# Patient Record
Sex: MALE | Race: WHITE | NOT HISPANIC OR LATINO | Employment: OTHER | URBAN - METROPOLITAN AREA
[De-identification: names, ages, dates, MRNs, and addresses within clinical notes are randomized per-mention and may not be internally consistent; named-entity substitution may affect disease eponyms.]

---

## 2021-10-19 ENCOUNTER — HISTORICAL (OUTPATIENT)
Dept: ADMINISTRATIVE | Facility: HOSPITAL | Age: 81
End: 2021-10-19

## 2022-05-09 DIAGNOSIS — R19.5 ABNORMAL STOOLS: Primary | ICD-10-CM

## 2022-09-21 ENCOUNTER — OFFICE VISIT (OUTPATIENT)
Dept: GASTROENTEROLOGY | Facility: CLINIC | Age: 82
End: 2022-09-21
Payer: MEDICARE

## 2022-09-21 VITALS
DIASTOLIC BLOOD PRESSURE: 89 MMHG | OXYGEN SATURATION: 95 % | HEIGHT: 71 IN | HEART RATE: 80 BPM | BODY MASS INDEX: 25.79 KG/M2 | SYSTOLIC BLOOD PRESSURE: 162 MMHG | WEIGHT: 184.19 LBS | TEMPERATURE: 98 F

## 2022-09-21 DIAGNOSIS — D50.9 IRON DEFICIENCY ANEMIA, UNSPECIFIED IRON DEFICIENCY ANEMIA TYPE: Primary | ICD-10-CM

## 2022-09-21 DIAGNOSIS — R19.5 ABNORMAL STOOLS: ICD-10-CM

## 2022-09-21 DIAGNOSIS — K21.9 GASTROESOPHAGEAL REFLUX DISEASE, UNSPECIFIED WHETHER ESOPHAGITIS PRESENT: ICD-10-CM

## 2022-09-21 DIAGNOSIS — R19.5 POSITIVE FIT (FECAL IMMUNOCHEMICAL TEST): ICD-10-CM

## 2022-09-21 DIAGNOSIS — Z86.010 PERSONAL HISTORY OF COLONIC POLYPS: ICD-10-CM

## 2022-09-21 PROCEDURE — 1159F PR MEDICATION LIST DOCUMENTED IN MEDICAL RECORD: ICD-10-PCS | Mod: CPTII,S$GLB,, | Performed by: INTERNAL MEDICINE

## 2022-09-21 PROCEDURE — 1160F RVW MEDS BY RX/DR IN RCRD: CPT | Mod: CPTII,S$GLB,, | Performed by: INTERNAL MEDICINE

## 2022-09-21 PROCEDURE — 3079F PR MOST RECENT DIASTOLIC BLOOD PRESSURE 80-89 MM HG: ICD-10-PCS | Mod: CPTII,S$GLB,, | Performed by: INTERNAL MEDICINE

## 2022-09-21 PROCEDURE — 3079F DIAST BP 80-89 MM HG: CPT | Mod: CPTII,S$GLB,, | Performed by: INTERNAL MEDICINE

## 2022-09-21 PROCEDURE — 99204 PR OFFICE/OUTPT VISIT, NEW, LEVL IV, 45-59 MIN: ICD-10-PCS | Mod: S$GLB,,, | Performed by: INTERNAL MEDICINE

## 2022-09-21 PROCEDURE — 1160F PR REVIEW ALL MEDS BY PRESCRIBER/CLIN PHARMACIST DOCUMENTED: ICD-10-PCS | Mod: CPTII,S$GLB,, | Performed by: INTERNAL MEDICINE

## 2022-09-21 PROCEDURE — 3077F PR MOST RECENT SYSTOLIC BLOOD PRESSURE >= 140 MM HG: ICD-10-PCS | Mod: CPTII,S$GLB,, | Performed by: INTERNAL MEDICINE

## 2022-09-21 PROCEDURE — 99204 OFFICE O/P NEW MOD 45 MIN: CPT | Mod: S$GLB,,, | Performed by: INTERNAL MEDICINE

## 2022-09-21 PROCEDURE — 3077F SYST BP >= 140 MM HG: CPT | Mod: CPTII,S$GLB,, | Performed by: INTERNAL MEDICINE

## 2022-09-21 PROCEDURE — 1159F MED LIST DOCD IN RCRD: CPT | Mod: CPTII,S$GLB,, | Performed by: INTERNAL MEDICINE

## 2022-09-21 RX ORDER — METHOCARBAMOL 500 MG/1
TABLET, FILM COATED ORAL
COMMUNITY
Start: 2022-06-16

## 2022-09-21 RX ORDER — SODIUM, POTASSIUM,MAG SULFATES 17.5-3.13G
1 SOLUTION, RECONSTITUTED, ORAL ORAL ONCE
Qty: 1 KIT | Refills: 0 | Status: SHIPPED | OUTPATIENT
Start: 2022-09-21 | End: 2022-09-21

## 2022-09-21 RX ORDER — ROSUVASTATIN CALCIUM 40 MG/1
TABLET, COATED ORAL
COMMUNITY
Start: 2022-07-24

## 2022-09-21 RX ORDER — ALBUTEROL SULFATE 90 UG/1
2 AEROSOL, METERED RESPIRATORY (INHALATION) EVERY 4 HOURS PRN
COMMUNITY
Start: 2022-07-15

## 2022-09-21 RX ORDER — VALSARTAN 160 MG/1
160 TABLET ORAL DAILY
COMMUNITY

## 2022-09-21 RX ORDER — IRBESARTAN 150 MG/1
TABLET ORAL
COMMUNITY
Start: 2022-06-27

## 2022-09-21 RX ORDER — DILTIAZEM HYDROCHLORIDE 240 MG/1
180 CAPSULE, EXTENDED RELEASE ORAL DAILY
COMMUNITY

## 2022-09-21 RX ORDER — EZETIMIBE 10 MG/1
TABLET ORAL
COMMUNITY
Start: 2022-08-18

## 2022-09-21 NOTE — PROGRESS NOTES
Clinic Note    Reason for visit:  The primary encounter diagnosis was Iron deficiency anemia, unspecified iron deficiency anemia type. Diagnoses of Gastroesophageal reflux disease, unspecified whether esophagitis present, Positive FIT (fecal immunochemical test), Personal history of colonic polyps, and Abnormal stools were also pertinent to this visit.    PCP: Hans Lanza   [address] [facility] / [place] [id number]    HPI:  This is a 81 y.o. male who is being referred-he has anemia and has heme positive stools per referral.  He denies melena or BRBPR.  Denies NSAIDs, abdominal pain, CP, or SOB.  He does have renal insufficiency. Reports some intermittent heartburn.     COLON 5/2019-tortuous colon, diverticuli, no polyps.      Review of Systems   Constitutional:  Negative for chills, diaphoresis, fatigue, fever and unexpected weight change.   HENT:  Negative for hearing loss, mouth sores, nosebleeds, postnasal drip, sore throat, trouble swallowing and voice change.    Eyes:  Negative for pain, discharge and eye dryness.   Respiratory:  Positive for cough. Negative for apnea, choking, chest tightness, shortness of breath and wheezing.    Cardiovascular:  Negative for chest pain, palpitations, leg swelling and claudication.   Gastrointestinal:  Negative for abdominal distention, abdominal pain, anal bleeding, blood in stool, change in bowel habit, constipation, diarrhea, nausea, rectal pain, vomiting, reflux, fecal incontinence and change in bowel habit.   Genitourinary:  Negative for bladder incontinence, difficulty urinating, dysuria, flank pain, frequency and hematuria.   Musculoskeletal:  Positive for back pain. Negative for arthralgias, joint swelling and joint deformity.   Integumentary:  Negative for color change, rash and wound.   Allergic/Immunologic: Positive for environmental allergies. Negative for food allergies.   Neurological:  Negative for seizures, facial asymmetry, speech difficulty, weakness,  "headaches and memory loss.   Hematological:  Negative for adenopathy. Does not bruise/bleed easily.   Psychiatric/Behavioral:  Negative for agitation, behavioral problems, confusion, hallucinations and sleep disturbance.       Past Medical History:   Diagnosis Date    Colon polyp     Essential (primary) hypertension     Hyperlipidemia     Renal insufficiency     Sleep apnea, unspecified      Past Surgical History:   Procedure Laterality Date    APPENDECTOMY      CARPAL TUNNEL RELEASE      COLONOSCOPY      SHOULDER SURGERY Left     UPPER GASTROINTESTINAL ENDOSCOPY       Family History   Problem Relation Age of Onset    Kidney failure Mother     Heart disease Father     Ulcerative colitis Neg Hx     Stomach cancer Neg Hx     Liver cancer Neg Hx     Crohn's disease Neg Hx     Throat cancer Neg Hx     Esophageal cancer Neg Hx     Pancreatic cancer Neg Hx      Social History     Tobacco Use    Smoking status: Never     Passive exposure: Never    Smokeless tobacco: Never   Substance Use Topics    Alcohol use: Yes     Comment: rarely     Review of patient's allergies indicates:  No Known Allergies     Medication List with Changes/Refills   New Medications    SODIUM,POTASSIUM,MAG SULFATES (SUPREP BOWEL PREP KIT) 17.5-3.13-1.6 GRAM SOLR    Take 177 mLs by mouth once. Take according to kit instructions but DO NOT eat breakfast the morning of procedure. for 1 dose   Current Medications    ALBUTEROL (PROVENTIL/VENTOLIN HFA) 90 MCG/ACTUATION INHALER    Inhale 2 puffs into the lungs every 4 (four) hours as needed.    DILTIAZEM (DILACOR XR) 240 MG CDCR    Take 180 mg by mouth once daily.    EZETIMIBE (ZETIA) 10 MG TABLET        IRBESARTAN (AVAPRO) 150 MG TABLET        METHOCARBAMOL (ROBAXIN) 500 MG TAB        ROSUVASTATIN (CRESTOR) 40 MG TAB        VALSARTAN (DIOVAN) 160 MG TABLET    Take 160 mg by mouth once daily.         Vital Signs:  BP (!) 162/89   Pulse 80   Temp 98.1 °F (36.7 °C)   Ht 5' 11" (1.803 m)   Wt 83.6 kg (184 " lb 3.2 oz)   SpO2 95%   BMI 25.69 kg/m²         Physical Exam  Constitutional:       General: He is not in acute distress.     Appearance: Normal appearance. He is well-developed. He is not ill-appearing or toxic-appearing.   HENT:      Head: Normocephalic and atraumatic.      Nose: Nose normal.      Mouth/Throat:      Mouth: Mucous membranes are moist.      Pharynx: Oropharynx is clear. No oropharyngeal exudate or posterior oropharyngeal erythema.   Eyes:      General: Lids are normal. No scleral icterus.        Right eye: No discharge.         Left eye: No discharge.      Extraocular Movements: Extraocular movements intact.      Conjunctiva/sclera: Conjunctivae normal.   Cardiovascular:      Rate and Rhythm: Normal rate and regular rhythm.      Pulses:           Radial pulses are 2+ on the right side and 2+ on the left side.   Pulmonary:      Effort: Pulmonary effort is normal. No respiratory distress.      Breath sounds: No stridor. No wheezing or rhonchi.   Abdominal:      General: Bowel sounds are normal. There is no distension.      Palpations: Abdomen is soft. There is no fluid wave, hepatomegaly, splenomegaly or mass.      Tenderness: There is no abdominal tenderness. There is no guarding or rebound.   Musculoskeletal:      Cervical back: Full passive range of motion without pain.      Right lower leg: No edema.      Left lower leg: No edema.   Lymphadenopathy:      Cervical: No cervical adenopathy.   Skin:     General: Skin is warm and dry.      Capillary Refill: Capillary refill takes less than 2 seconds.      Coloration: Skin is not cyanotic, jaundiced or pale.      Findings: No rash.   Neurological:      General: No focal deficit present.      Mental Status: He is alert and oriented to person, place, and time.   Psychiatric:         Mood and Affect: Mood normal.         Behavior: Behavior is cooperative.          All of the data above and below has been reviewed by myself and any further  interpretations will be reflected in the assessment and plan.   The data includes review of external notes, and independent interpretation of lab results, procedures, x-rays, and imaging reports.      Assessment:  Iron deficiency anemia, unspecified iron deficiency anemia type  -     Ambulatory Referral to External Surgery    Gastroesophageal reflux disease, unspecified whether esophagitis present  -     Ambulatory Referral to External Surgery    Positive FIT (fecal immunochemical test)  -     Ambulatory Referral to External Surgery    Personal history of colonic polyps    Abnormal stools  -     Ambulatory referral/consult to Gastroenterology    Other orders  -     sodium,potassium,mag sulfates (SUPREP BOWEL PREP KIT) 17.5-3.13-1.6 gram SolR; Take 177 mLs by mouth once. Take according to kit instructions but DO NOT eat breakfast the morning of procedure. for 1 dose  Dispense: 1 kit; Refill: 0    If EGD/Colon negative as source of anemia, will consider capsule endoscopy.     Recommendations:  Schedule EGD/Colonoscopy at [facility] with suprep    Risks, benefits, and alternatives of medical management, any associated procedures, and/or treatment discussed with the patient. Patient given opportunity to ask questions and voices understanding. Patient has elected to proceed with the recommended care modalities as discussed.    Follow up if symptoms worsen or fail to improve.    Order summary:  Orders Placed This Encounter   Procedures    Ambulatory Referral to External Surgery          Instructed patient to notify my office if they have not been contacted within two weeks after any procedures, submitting any samples (biopsies, blood, stool, urine, etc.) or after any imaging (X-ray, CT, MRI, etc.).     Bill Harry MD    This document may have been created using a voice recognition transcribing system. Incorrect words or phrases may have been missed during proofreading. Please interpret accordingly or contact me for  clarification.

## 2022-10-18 ENCOUNTER — OUTSIDE PLACE OF SERVICE (OUTPATIENT)
Dept: GASTROENTEROLOGY | Facility: CLINIC | Age: 82
End: 2022-10-18

## 2022-10-18 LAB
CRC RECOMMENDATION EXT: NORMAL
EGD FOLLOW UP EXTERNAL: NORMAL
EGD FOLLOW UP EXTERNAL: NORMAL

## 2022-10-18 PROCEDURE — 43239 EGD BIOPSY SINGLE/MULTIPLE: CPT | Mod: 51,,, | Performed by: INTERNAL MEDICINE

## 2022-10-18 PROCEDURE — 45385 PR COLONOSCOPY,REMV LESN,SNARE: ICD-10-PCS | Mod: ,,, | Performed by: INTERNAL MEDICINE

## 2022-10-18 PROCEDURE — 45385 COLONOSCOPY W/LESION REMOVAL: CPT | Mod: ,,, | Performed by: INTERNAL MEDICINE

## 2022-10-18 PROCEDURE — 43239 PR EGD, FLEX, W/BIOPSY, SGL/MULTI: ICD-10-PCS | Mod: 51,,, | Performed by: INTERNAL MEDICINE

## 2022-10-24 ENCOUNTER — TELEPHONE (OUTPATIENT)
Dept: GASTROENTEROLOGY | Facility: CLINIC | Age: 82
End: 2022-10-24
Payer: MEDICARE

## 2022-10-24 DIAGNOSIS — K22.70 BARRETT'S ESOPHAGUS WITHOUT DYSPLASIA: Primary | ICD-10-CM

## 2022-10-24 DIAGNOSIS — D50.9 IRON DEFICIENCY ANEMIA, UNSPECIFIED IRON DEFICIENCY ANEMIA TYPE: ICD-10-CM

## 2022-10-24 DIAGNOSIS — K22.10 ULCERATIVE ESOPHAGITIS: ICD-10-CM

## 2022-10-27 VITALS — WEIGHT: 184 LBS | HEIGHT: 71 IN | BODY MASS INDEX: 25.76 KG/M2

## 2022-10-27 NOTE — TELEPHONE ENCOUNTER
"[facility]  [address] [place] [address]  Phone: [phone]  Fax: [phone]    History & Physical         Provider: Dr. Bill Harry    Patient Name: Franki VERA (age):1940  81 y.o.           Gender: male   Phone: [phone]     Referring Physician: Hans Lanza     Vital Signs:   Height - 5' 11"  Weight - 184 lbs  BMI - 25.66      Plan: EGD    Encounter Diagnosis   Name Primary?    Iron deficiency anemia, unspecified iron deficiency anemia type Yes           History:      Past Medical History:   Diagnosis Date    Colon polyp     Essential (primary) hypertension     Hyperlipidemia     Renal insufficiency     Sleep apnea, unspecified       Past Surgical History:   Procedure Laterality Date    APPENDECTOMY      CARPAL TUNNEL RELEASE      COLONOSCOPY      SHOULDER SURGERY Left     UPPER GASTROINTESTINAL ENDOSCOPY        Medication List with Changes/Refills   Current Medications    ALBUTEROL (PROVENTIL/VENTOLIN HFA) 90 MCG/ACTUATION INHALER    Inhale 2 puffs into the lungs every 4 (four) hours as needed.    DILTIAZEM (DILACOR XR) 240 MG CDCR    Take 180 mg by mouth once daily.    EZETIMIBE (ZETIA) 10 MG TABLET        IRBESARTAN (AVAPRO) 150 MG TABLET        METHOCARBAMOL (ROBAXIN) 500 MG TAB        ROSUVASTATIN (CRESTOR) 40 MG TAB        VALSARTAN (DIOVAN) 160 MG TABLET    Take 160 mg by mouth once daily.      Review of patient's allergies indicates:  No Known Allergies   Family History   Problem Relation Age of Onset    Kidney failure Mother     Heart disease Father     Ulcerative colitis Neg Hx     Stomach cancer Neg Hx     Liver cancer Neg Hx     Crohn's disease Neg Hx     Throat cancer Neg Hx     Esophageal cancer Neg Hx     Pancreatic cancer Neg Hx       Social History     Tobacco Use    Smoking status: Never     Passive exposure: Never    Smokeless tobacco: Never   Substance " Use Topics    Alcohol use: Yes     Comment: rarely        Physical Examination:     General Appearance:___________________________  HEENT: _____________________________________  Abdomen:____________________________________  Heart:________________________________________  Lungs:_______________________________________  Extremities:___________________________________  Skin:_________________________________________  Endocrine:____________________________________  Genitourinary:_________________________________  Neurological:__________________________________      Patient has been evaluated immediately prior to sedation and is medically cleared for endoscopy with IVCS as an ASA class: ______      Physician Signature: _________________________       Date: ________  Time: ________

## 2022-10-27 NOTE — TELEPHONE ENCOUNTER
Contacted patient and scheduled Repeat EGD @ [facility] on December 14, 2022 per Dr Harry & Faith. Discussed EGD Prep with patient.  David

## 2022-10-27 NOTE — TELEPHONE ENCOUNTER
"[facility]  [address] [place] [address]  Phone: [phone]  Fax: [phone]    History & Physical         Provider: Dr. Bill Harry    Patient Name: Franki VERA (age):1940  81 y.o.           Gender: male   Phone: [phone]     Referring Physician: Hans Lanza     Vital Signs:   Height - 5' 11"  Weight - 184 lbs  BMI - 25.66      Plan: EGD    Encounter Diagnoses   Name Primary?    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 Yes    Ulcerative esophagitis     Iron deficiency anemia, unspecified iron deficiency anemia type            History:      Past Medical History:   Diagnosis Date    BMI 25.0-25.9,adult     Colon polyp     Essential (primary) hypertension     Hyperlipidemia     Renal insufficiency     Sleep apnea, unspecified       Past Surgical History:   Procedure Laterality Date    APPENDECTOMY      CARPAL TUNNEL RELEASE      COLONOSCOPY  10/18/2022    ESOPHAGOGASTRODUODENOSCOPY  10/18/2022    EGD    ESOPHAGOGASTRODUODENOSCOPY      EGD Repeated on 2022    SHOULDER SURGERY Left     UPPER GASTROINTESTINAL ENDOSCOPY        Medication List with Changes/Refills   Current Medications    ALBUTEROL (PROVENTIL/VENTOLIN HFA) 90 MCG/ACTUATION INHALER    Inhale 2 puffs into the lungs every 4 (four) hours as needed.    DILTIAZEM (DILACOR XR) 240 MG CDCR    Take 180 mg by mouth once daily.    EZETIMIBE (ZETIA) 10 MG TABLET        IRBESARTAN (AVAPRO) 150 MG TABLET        METHOCARBAMOL (ROBAXIN) 500 MG TAB        ROSUVASTATIN (CRESTOR) 40 MG TAB        VALSARTAN (DIOVAN) 160 MG TABLET    Take 160 mg by mouth once daily.      Review of patient's allergies indicates:  No Known Allergies   Family History   Problem Relation Age of Onset    Kidney failure Mother     Heart disease Father     Ulcerative colitis Neg Hx     Stomach cancer Neg Hx     Liver cancer Neg Hx     Crohn's disease Neg Hx "     Throat cancer Neg Hx     Esophageal cancer Neg Hx     Pancreatic cancer Neg Hx       Social History     Tobacco Use    Smoking status: Never     Passive exposure: Never    Smokeless tobacco: Never   Substance Use Topics    Alcohol use: Yes     Comment: rarely        Physical Examination:     General Appearance:___________________________  HEENT: _____________________________________  Abdomen:____________________________________  Heart:________________________________________  Lungs:_______________________________________  Extremities:___________________________________  Skin:_________________________________________  Endocrine:____________________________________  Genitourinary:_________________________________  Neurological:__________________________________      Patient has been evaluated immediately prior to sedation and is medically cleared for endoscopy with IVCS as an ASA class: ______      Physician Signature: _________________________       Date: ________  Time: ________

## 2022-12-02 ENCOUNTER — DOCUMENTATION ONLY (OUTPATIENT)
Dept: GASTROENTEROLOGY | Facility: CLINIC | Age: 82
End: 2022-12-02
Payer: MEDICARE

## 2022-12-14 ENCOUNTER — OUTSIDE PLACE OF SERVICE (OUTPATIENT)
Dept: GASTROENTEROLOGY | Facility: CLINIC | Age: 82
End: 2022-12-14

## 2022-12-14 PROCEDURE — 43239 PR EGD, FLEX, W/BIOPSY, SGL/MULTI: ICD-10-PCS | Mod: ,,, | Performed by: INTERNAL MEDICINE

## 2022-12-14 PROCEDURE — 43239 EGD BIOPSY SINGLE/MULTIPLE: CPT | Mod: ,,, | Performed by: INTERNAL MEDICINE

## 2023-01-03 ENCOUNTER — TELEPHONE (OUTPATIENT)
Dept: GASTROENTEROLOGY | Facility: CLINIC | Age: 83
End: 2023-01-03
Payer: MEDICARE

## 2023-01-03 NOTE — TELEPHONE ENCOUNTER
Per Dr. Harry: Bx's of esophagus show reflux but no Tsai's-continue pantoprazole-defer repeat EGD unless patient has issues.     Called patient with results and instructions, verbalized understanding.

## 2023-01-23 RX ORDER — PANTOPRAZOLE SODIUM 40 MG/1
40 TABLET, DELAYED RELEASE ORAL DAILY
Qty: 30 TABLET | Refills: 11 | Status: SHIPPED | OUTPATIENT
Start: 2023-01-23 | End: 2023-05-08 | Stop reason: SDUPTHER

## 2023-01-23 NOTE — TELEPHONE ENCOUNTER
----- Message from Jadyn Valverde sent at 1/23/2023  9:31 AM CST -----  Type:  Needs Medical Advice    Who Called: Franki Tsai    Symptoms (please be specific): -   How long has patient had these symptoms:  -  Pharmacy name and phone #:    [facility] [id number] - [place] - [address] AT [facility] [address]  [address] [place] [address]  Phone: [phone] Fax: [phone]      Would the patient rather a call back or a response via [facility]? -  Best Call Back Number: [phone]    Additional Information: pt wants to know if he iss to continue taking the medication pantoprazole 40 MG? ( If so he needs a refill ) 90 day supply needs to be sent to [facility], 30 day supply [facility]

## 2023-04-24 ENCOUNTER — TELEPHONE (OUTPATIENT)
Dept: GASTROENTEROLOGY | Facility: CLINIC | Age: 83
End: 2023-04-24
Payer: MEDICARE

## 2023-04-24 NOTE — TELEPHONE ENCOUNTER
----- Message from Kathy Leonard sent at 4/24/2023  3:57 PM CDT -----  Regarding: Medication  Contact: patient  Per phone call with patient, he stated that if the medication is a 90 day supply it should go to [facility] and if it is something urgent it [facility]  Please return call at [phone].    There was a prescription that was sent to [facility] and it was canceled.  Please submit the medication to [facility].    Thanks,  SJ

## 2023-05-08 NOTE — TELEPHONE ENCOUNTER
----- Message from Beth Marshall sent at 5/8/2023  9:52 AM CDT -----  Regarding: med refill/pharm change  Contact: pt  Pt is calling to get a rx for pantoprazole (PROTONIX) 40 MG tablet sent to [facility]. Pt states that he needs a 90 day supply with refills. Pt states that he called last week and that the rx was not sent.  Please call back at [phone]//thank you acc

## 2023-05-08 NOTE — TELEPHONE ENCOUNTER
----- Message from Beth Marshall sent at 5/8/2023  9:52 AM CDT -----  Regarding: med refill/pharm change  Contact: pt  Pt is calling to get a rx for pantoprazole (PROTONIX) 40 MG tablet sent to [facility]. Pt states that he needs a 90 day supply with refills. Pt states that he called last week and that the rx was not sent.  Please call back at [phone]//thank you acc

## 2023-05-09 RX ORDER — PANTOPRAZOLE SODIUM 40 MG/1
40 TABLET, DELAYED RELEASE ORAL DAILY
Qty: 90 TABLET | Refills: 3 | Status: SHIPPED | OUTPATIENT
Start: 2023-05-09 | End: 2024-05-08

## 2023-05-09 RX ORDER — PANTOPRAZOLE SODIUM 40 MG/1
40 TABLET, DELAYED RELEASE ORAL DAILY
Qty: 30 TABLET | Refills: 11 | Status: SHIPPED | OUTPATIENT
Start: 2023-05-09 | End: 2023-05-09 | Stop reason: SDUPTHER

## 2024-05-21 ENCOUNTER — TELEPHONE (OUTPATIENT)
Dept: GASTROENTEROLOGY | Facility: CLINIC | Age: 84
End: 2024-05-21
Payer: MEDICARE

## 2024-05-21 NOTE — TELEPHONE ENCOUNTER
----- Message from Savana Sanderson sent at 5/21/2024  1:08 PM CDT -----  Contact: Toy  .Patient is calling to speak with the nurse regarding concerns . Reports getting a letter stating patient needs to know what the letter is for   . Please give patient a call back at   [phone] (home) [phone] (work)

## 2024-05-21 NOTE — TELEPHONE ENCOUNTER
Patient was advised that he isn't due for his next colonoscopy until 12/13/2026. Patient voiced understanding. DMP